# Patient Record
Sex: FEMALE | Race: WHITE | Employment: STUDENT | URBAN - METROPOLITAN AREA
[De-identification: names, ages, dates, MRNs, and addresses within clinical notes are randomized per-mention and may not be internally consistent; named-entity substitution may affect disease eponyms.]

---
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
## 2020-02-13 NOTE — PROGRESS NOTES
PT DAILY TREATMENT NOTE     Patient Name: Meche Reed  Date:2017  : 2000  [x]  Patient  Verified  Payor:  / Plan: [facility]  ACTIVE DUTY AND DEPENDENTS / Product Type:  /    In time:4:46  Out time:6:02  Total Treatment Time (min): 76  Total Timed Codes (min): 58  1:1 Treatment Time (min):    Visit #:     Treatment Area: Pain in right knee [M25.561]    SUBJECTIVE  Pain Level (0-10 scale): 2/10  Any medication changes, allergies to medications, adverse drug reactions, diagnosis change, or new procedure performed?: [x] No    [] Yes (see summary sheet for update)  Subjective functional status/changes:   [] No changes reported  My knee has been feeling pretty good lately, I just have to be careful with going up and down the stairs at school.     OBJECTIVE  Modality rationale: decrease inflammation and decrease pain to improve the patients ability to improve functional abilities   Min Type Additional Details    [] Estim: []Att   []Unatt        []TENS instruct                  []IFC  []Premod   []NMES                     []Other:  []w/US   []w/ice   []w/heat  Position:  Location:    []  Traction: [] Cervical       []Lumbar                       [] Prone          []Supine                       []Intermittent   []Continuous Lbs:  [] before manual  [] after manual    []  Ultrasound: []Continuous   [] Pulsed                           []1MHz   []3MHz Location:  W/cm2:    []  Iontophoresis with dexamethasone         Location: [] Take home patch   [] In clinic   10 [x]  Ice     []  heat  []  Ice massage Position:supine  Location:B knees    []  Vasopneumatic Device Pressure:       [] lo [] med [] hi   Temperature: [] lo [] med [] hi   [] Skin assessment post-treatment:  []intact []redness- no adverse reaction       []redness  adverse reaction:       58 min Therapeutic Exercise:  [x] See flow sheet :   Rationale: increase ROM, increase strength, improve balance and increase proprioception to improve the patients ability to improve functional abilities    8 min Manual Therapy: Kinesiotaping with vertical and U-shaped I strips for patellar tendonitis at 50% tension   Rationale: decrease pain, increase ROM, increase tissue extensibility and decrease edema  to increase functional mobility.             min Patient Education: [x] Review HEP    [] Progressed/Changed HEP based on:   [] positioning   [] body mechanics   [] transfers   [] heat/ice application        Other Objective/Functional Measures:     Pain Level (0-10 scale) post treatment: 0    ASSESSMENT/Changes in Function: Pt had a trial fitting on medial knee unloading brace during treatment today and agreed that she would benefit from obtaining a prescription for brace which we are current working on. Pt presented with new c/o lateral knee pain with performing SL total gym squats today which was addressed with ITB stretches. Will continue to progress/advance patient within current POC as tolerated with monitoring symptoms. Patient will continue to benefit from skilled PT services to modify and progress therapeutic interventions, address functional mobility deficits, address ROM deficits, address strength deficits and analyze and cue movement patterns to attain remaining goals.      []  See Plan of Care  []  See progress note/recertification  []  See Discharge Summary         Progress towards goals / Updated goals:      PLAN  [x]  Upgrade activities as tolerated     []  Continue plan of care  []  Update interventions per flow sheet       []  Discharge due to:_  []  Other:_      Tucker Edge PTA 2017  4:42 PM never used